# Patient Record
Sex: FEMALE | URBAN - METROPOLITAN AREA
[De-identification: names, ages, dates, MRNs, and addresses within clinical notes are randomized per-mention and may not be internally consistent; named-entity substitution may affect disease eponyms.]

---

## 2022-04-26 ENCOUNTER — APPOINTMENT (RX ONLY)
Dept: URBAN - METROPOLITAN AREA CLINIC 33 | Facility: CLINIC | Age: 47
Setting detail: DERMATOLOGY
End: 2022-04-26

## 2022-04-26 VITALS
HEIGHT: 62 IN | SYSTOLIC BLOOD PRESSURE: 134 MMHG | WEIGHT: 175 LBS | HEART RATE: 65 BPM | DIASTOLIC BLOOD PRESSURE: 85 MMHG

## 2022-04-26 DIAGNOSIS — L71.0 PERIORAL DERMATITIS: ICD-10-CM

## 2022-04-26 DIAGNOSIS — R03.0 ELEVATED BLOOD-PRESSURE READING, WITHOUT DIAGNOSIS OF HYPERTENSION: ICD-10-CM

## 2022-04-26 PROCEDURE — ? PRESCRIPTION

## 2022-04-26 PROCEDURE — ? PLAN FOR BMI MANAGEMENT

## 2022-04-26 PROCEDURE — 99203 OFFICE O/P NEW LOW 30 MIN: CPT

## 2022-04-26 PROCEDURE — ? COUNSELING

## 2022-04-26 RX ORDER — MINOCYCLINE HYDROCHLORIDE 100 MG/1
TAKE 1 CAPSULE CAPSULE ORAL QD
Qty: 30 | Refills: 2 | Status: ERX

## 2022-04-26 RX ORDER — PIMECROLIMUS 10 MG/G
THIN LAYER CREAM TOPICAL BID
Qty: 30 | Refills: 1 | Status: ERX

## 2022-04-26 ASSESSMENT — LOCATION DETAILED DESCRIPTION DERM
LOCATION DETAILED: LEFT INFERIOR NASAL CHEEK
LOCATION DETAILED: LEFT LOWER CUTANEOUS LIP
LOCATION DETAILED: RIGHT CENTRAL BUCCAL CHEEK

## 2022-04-26 ASSESSMENT — LOCATION ZONE DERM
LOCATION ZONE: FACE
LOCATION ZONE: LIP

## 2022-04-26 ASSESSMENT — LOCATION SIMPLE DESCRIPTION DERM
LOCATION SIMPLE: RIGHT CHEEK
LOCATION SIMPLE: LEFT LIP
LOCATION SIMPLE: LEFT CHEEK

## 2022-04-26 NOTE — PROCEDURE: COUNSELING
Quality 317: Preventative Care And Screening: Screening For High Blood Pressure And Follow-Up Documented: Pre-hypertensive or hypertensive blood pressure reading documented, and the indicated follow-up is documented
Detail Level: Detailed
Cleanser Recommendations: CeraVe or Cetaphil Line or Vanicare
Moisturizer Recommendations: CeraVe or Cetaphil Line, or Vanicare
Patient Specific Counseling (Will Not Stick From Patient To Patient): will treat by the same means as perioral dermatitis plan.
Detail Level: Simple
Patient Specific Counseling (Will Not Stick From Patient To Patient): Patient has had problems with jose david-oral dermatitis in the past- this has flared and is not clearing with things she has tried.  She has been applying topical hydrocortisone which hasn't helped but helps with pruritus at times.  We reviewed and handout given - she has used Vanicare in the past along with oral minocycline - we are going to return to that . She also would like something topical - believe that Elidel will be helpful.  \\n\\nTREATMENT:\\n1 . Gentle wash 2 x daily - Vanicare line optimal Cetaphil Gentle Cleanser also fine\\n2.  Elidel can be used 2 x daily to affected areas - if stinging use 1 x daily for a few days in the AM then increase to 2 x daily\\n3 . Light moisturizer if needed - \\n4.  Minocycline 1 x daily avoid taking with calcium, iron or zinc.  \\n\\nShe understands treatment can take 3 months- and we reviewed potential adverse effects of antibiotic therapy and also black box warning of Elidel.  \\n\\nCall if concerns before we will treat her Perinasal and also perioral dermatitis by this method.

## 2022-07-19 ENCOUNTER — APPOINTMENT (RX ONLY)
Dept: URBAN - METROPOLITAN AREA CLINIC 33 | Facility: CLINIC | Age: 47
Setting detail: DERMATOLOGY
End: 2022-07-19

## 2022-07-19 VITALS
DIASTOLIC BLOOD PRESSURE: 88 MMHG | SYSTOLIC BLOOD PRESSURE: 137 MMHG | HEIGHT: 62 IN | HEART RATE: 79 BPM | WEIGHT: 175 LBS

## 2022-07-19 DIAGNOSIS — L71.0 PERIORAL DERMATITIS: ICD-10-CM | Status: IMPROVED

## 2022-07-19 DIAGNOSIS — R03.0 ELEVATED BLOOD-PRESSURE READING, WITHOUT DIAGNOSIS OF HYPERTENSION: ICD-10-CM

## 2022-07-19 DIAGNOSIS — T07XXXA ABRASION OR FRICTION BURN OF OTHER, MULTIPLE, AND UNSPECIFIED SITES, WITHOUT MENTION OF INFECTION: ICD-10-CM

## 2022-07-19 PROBLEM — T14.8XXA OTHER INJURY OF UNSPECIFIED BODY REGION, INITIAL ENCOUNTER: Status: ACTIVE | Noted: 2022-07-19

## 2022-07-19 PROCEDURE — ? PLAN FOR BMI MANAGEMENT

## 2022-07-19 PROCEDURE — ? COUNSELING

## 2022-07-19 PROCEDURE — 99213 OFFICE O/P EST LOW 20 MIN: CPT

## 2022-07-19 ASSESSMENT — LOCATION SIMPLE DESCRIPTION DERM
LOCATION SIMPLE: RIGHT CHEEK
LOCATION SIMPLE: RIGHT SUBMANDIBULAR AREA
LOCATION SIMPLE: LEFT LIP
LOCATION SIMPLE: LEFT CHEEK

## 2022-07-19 ASSESSMENT — LOCATION DETAILED DESCRIPTION DERM
LOCATION DETAILED: RIGHT CENTRAL BUCCAL CHEEK
LOCATION DETAILED: LEFT LOWER CUTANEOUS LIP
LOCATION DETAILED: RIGHT SUBMANDIBULAR AREA
LOCATION DETAILED: LEFT INFERIOR NASAL CHEEK

## 2022-07-19 ASSESSMENT — LOCATION ZONE DERM
LOCATION ZONE: LIP
LOCATION ZONE: FACE

## 2022-07-19 NOTE — PROCEDURE: COUNSELING
Detail Level: Detailed
Quality 317: Preventative Care And Screening: Screening For High Blood Pressure And Follow-Up Documented: Pre-hypertensive or hypertensive blood pressure reading documented, and the indicated follow-up is documented
Detail Level: Simple
Patient Specific Counseling (Will Not Stick From Patient To Patient): this has entirely cleared no concerns seen today.  She is done with Minocycline and also Elidel - at this time.  Reminded to avoid irritants . And to return to clinic if any concerns develop\\n\\nPrevious notes:\\nPatient has had problems with jose david-oral dermatitis in the past- this has flared and is not clearing with things she has tried.  She has been applying topical hydrocortisone which hasn't helped but helps with pruritus at times.  We reviewed and handout given - she has used Vanicare in the past along with oral minocycline - we are going to return to that . She also would like something topical - believe that Elidel will be helpful.  \\n\\nTREATMENT:\\n1 . Gentle wash 2 x daily - Vanicare line optimal Cetaphil Gentle Cleanser also fine\\n2.  Elidel can be used 2 x daily to affected areas - if stinging use 1 x daily for a few days in the AM then increase to 2 x daily\\n3 . Light moisturizer if needed - \\n4.  Minocycline 1 x daily avoid taking with calcium, iron or zinc.  \\n\\nShe understands treatment can take 3 months- and we reviewed potential adverse effects of antibiotic therapy and also black box warning of Elidel.  \\n\\nCall if concerns before we will treat her Perinasal and also perioral dermatitis by this method.
Cleanser Recommendations: CeraVe or Cetaphil Line or Vanicare
Moisturizer Recommendations: CeraVe or Cetaphil Line, or Vanicare
Patient Specific Counseling (Will Not Stick From Patient To Patient): Treated as noted in treatment plan last time for perioral dermatitis- reminded to avoid irritants.
Petrolatum Recommendations: Vaseline or Aquaphor
Patient Specific Counseling (Will Not Stick From Patient To Patient): She has developed this related to picking hairs on the chin - we reviewed this process discussed methods to address and avoid picking at hairs that are not out from the skin . We can often feel them but not see them - only go after those and grab hairs only with tweezers to address if using treatments.  \\n\\nPatient voiced understanding.

## 2022-07-19 NOTE — PROCEDURE: MIPS QUALITY
Quality 400a: One-Time Screening For Hepatitis C Virus (Hcv) And Treatment Initiation: Patient received one-time screening for HCV infection
Detail Level: Detailed
Additional Notes: Negative on Review:\\n\\nA Baby [place] (born between [id number]\\nGetting tattoos, body art or body piercing with unsterilized tool\\nGetting blood transfusions or having received blood products before 1992       \\nBeing a healthcare provider that had an accidental needle stick\\nUsing unsterile needles or straws with recreational drugs\\nGetting organ transplants before 1992     \\nBeing a [place] era  \\nHaving long-term dialysis for kidney disease\\nBeing born to a mother with Hep C\\nHaving HIV